# Patient Record
Sex: FEMALE | URBAN - METROPOLITAN AREA
[De-identification: names, ages, dates, MRNs, and addresses within clinical notes are randomized per-mention and may not be internally consistent; named-entity substitution may affect disease eponyms.]

---

## 2019-01-10 ENCOUNTER — APPOINTMENT (RX ONLY)
Dept: URBAN - METROPOLITAN AREA CLINIC 114 | Facility: CLINIC | Age: 69
Setting detail: DERMATOLOGY
End: 2019-01-10

## 2019-01-10 DIAGNOSIS — Z71.89 OTHER SPECIFIED COUNSELING: ICD-10-CM

## 2019-01-10 DIAGNOSIS — L82.1 OTHER SEBORRHEIC KERATOSIS: ICD-10-CM | Status: RESOLVING

## 2019-01-10 PROBLEM — E78.5 HYPERLIPIDEMIA, UNSPECIFIED: Status: ACTIVE | Noted: 2019-01-10

## 2019-01-10 PROBLEM — L57.0 ACTINIC KERATOSIS: Status: ACTIVE | Noted: 2019-01-10

## 2019-01-10 PROBLEM — E05.90 THYROTOXICOSIS, UNSPECIFIED WITHOUT THYROTOXIC CRISIS OR STORM: Status: ACTIVE | Noted: 2019-01-10

## 2019-01-10 PROCEDURE — ? COUNSELING

## 2019-01-10 PROCEDURE — ? DIAGNOSIS COMMENT

## 2019-01-10 PROCEDURE — 99212 OFFICE O/P EST SF 10 MIN: CPT

## 2019-01-10 ASSESSMENT — LOCATION SIMPLE DESCRIPTION DERM: LOCATION SIMPLE: RIGHT BREAST

## 2019-01-10 ASSESSMENT — LOCATION ZONE DERM: LOCATION ZONE: TRUNK

## 2019-01-10 ASSESSMENT — LOCATION DETAILED DESCRIPTION DERM: LOCATION DETAILED: RIGHT PERIAREOLAR BREAST 3-4:00 REGION

## 2019-01-10 NOTE — PROCEDURE: DIAGNOSIS COMMENT
Detail Level: Simple
Comment: Irritated SK. Patient states lesion is resolving. Offered LN2 but patient declined. No concerning features. Counseled patient to RTC if lesion starts to become more red/enlarge.

## 2019-01-10 NOTE — PROCEDURE: MIPS QUALITY
Quality 431: Preventive Care And Screening: Unhealthy Alcohol Use - Screening: Patient screened for unhealthy alcohol use using a single question and scores less than 2 times per year
Quality 110: Preventive Care And Screening: Influenza Immunization: Influenza Immunization previously received during influenza season
Quality 111:Pneumonia Vaccination Status For Older Adults: Pneumococcal Vaccination Previously Received
Detail Level: Detailed
Quality 226: Preventive Care And Screening: Tobacco Use: Screening And Cessation Intervention: Patient screened for tobacco use and is an ex/non-smoker

## 2019-05-17 ENCOUNTER — APPOINTMENT (RX ONLY)
Dept: URBAN - METROPOLITAN AREA CLINIC 110 | Facility: CLINIC | Age: 69
Setting detail: DERMATOLOGY
End: 2019-05-17

## 2019-05-17 DIAGNOSIS — D485 NEOPLASM OF UNCERTAIN BEHAVIOR OF SKIN: ICD-10-CM

## 2019-05-17 PROBLEM — D48.5 NEOPLASM OF UNCERTAIN BEHAVIOR OF SKIN: Status: ACTIVE | Noted: 2019-05-17

## 2019-05-17 PROBLEM — D23.72 OTHER BENIGN NEOPLASM OF SKIN OF LEFT LOWER LIMB, INCLUDING HIP: Status: ACTIVE | Noted: 2019-05-17

## 2019-05-17 PROCEDURE — ? COUNSELING

## 2019-05-17 PROCEDURE — 99213 OFFICE O/P EST LOW 20 MIN: CPT

## 2019-05-17 PROCEDURE — ? DIAGNOSIS COMMENT

## 2019-05-17 ASSESSMENT — LOCATION DETAILED DESCRIPTION DERM
LOCATION DETAILED: LEFT POSTERIOR ANKLE
LOCATION DETAILED: LEFT ANTERIOR DISTAL THIGH

## 2019-05-17 ASSESSMENT — LOCATION ZONE DERM: LOCATION ZONE: LEG

## 2019-05-17 ASSESSMENT — LOCATION SIMPLE DESCRIPTION DERM
LOCATION SIMPLE: LEFT ANKLE
LOCATION SIMPLE: LEFT THIGH

## 2019-05-17 NOTE — PROCEDURE: DIAGNOSIS COMMENT
Comment: Pt defers bx at this time. Pt will check with insurance to see how much bx’s will cost and will follow up in two weeks.
Detail Level: Simple

## 2019-05-17 NOTE — PROCEDURE: MIPS QUALITY
Quality 431: Preventive Care And Screening: Unhealthy Alcohol Use - Screening: Patient screened for unhealthy alcohol use using a single question and scores less than 2 times per year
Detail Level: Detailed
Quality 130: Documentation Of Current Medications In The Medical Record: Current Medications Documented
Quality 111:Pneumonia Vaccination Status For Older Adults: Pneumococcal Vaccination Previously Received
Quality 110: Preventive Care And Screening: Influenza Immunization: Influenza Immunization Administered during Influenza season
Quality 131: Pain Assessment And Follow-Up: Pain assessment using a standardized tool is documented as negative, no follow-up plan required
Quality 226: Preventive Care And Screening: Tobacco Use: Screening And Cessation Intervention: Patient screened for tobacco use and is an ex/non-smoker